# Patient Record
(demographics unavailable — no encounter records)

---

## 2017-02-22 NOTE — RADRPT
PROCEDURE:   US Abdomen. 

 

CLINICAL INDICATION:   abdominal pain 

 

TECHNIQUE:   Multiple real-time images were acquired of the patient's right upper quadrant abdomen a
nd retroperitoneum utilizing a high resolution transducer. 

 

COMPARISON:   None 

 

FINDINGS:

 

The liver demonstrates increased echogenicity.  The liver is normal in size and no focal solid lesio
ns are seen. The liver measures 13 cm in length. The portal vein is patent with normal direction of 
flow.  No intrahepatic biliary dilatation is seen. 

 

The patient is status post cholecystectomy. The common bile duct measures 14 mm in maximal dimension
.  

 

The visualized portions of the pancreas are unremarkable.   The tail of the pancreas is not seen.

 

No free fluid is identified.

 

The right kidney is normal in size, and demonstrate normal echogenicity and cortical thickness.  The
 right kidney measures 10 cm in long dimension.  There is no evidence of hydronephrosis. There are n
o kidney stones.  

 

 

RPTAT: AA

 

IMPRESSION:

 

Mild fatty infiltration of the liver.

Status post cholecystectomy.

Dilated CBD measuring 14 mm.

_____________________________________________ 

.Farhat Jacinto MD, MD           Date    Time 

Electronically viewed and signed by .Farhat Jacinto MD, MD on 02/22/2017 10:39 

 

D:  02/22/2017 10:39  T:  02/22/2017 10:39

.S/

## 2017-02-22 NOTE — ERD
ER Documentation


Chief Complaint


Date/Time


DATE: 2/22/17 


TIME: 10:58


Chief Complaint


gen abd pain and distention for the past few days. nause and vomiting





HPI


Patient is a 56-year-old female with no medical problems who presents with 

abdominal pain and vomiting.  She has upper abdominal pain which started on 

Sunday.  The pain is constant.  The symptoms were worse today.  She had no 

diarrhea and no fevers.  She tried a pain medicine but vomited.  She also tried 

Zofran and Nexium.  Upon review of old medical records this is the patient's 

third visit to the ER since 2013.





ROS


All systems reviewed and are negative except as per history of present illness.





Medications


Home Meds


Active Scripts


Ondansetron (Ondansetron Odt) 4 Mg Tab.rapdis, 4 MG PO Q6H Y for NAUSEA AND/OR 

VOMITING, #30 TAB


   Prov:NOBLE ALMAGUER MD         2/22/17


Hydrocodone/Acetaminophen (Norco  Tablet) 1 Each Tablet, 1 TAB PO Q6H Y 

for PAIN, #7 TAB


   Prov:NOBLE ALMAGUER MD         2/22/17


Nitrofurantoin Monohyd Macrocr* (Macrobid*) 100 Mg Capsr, 100 MG PO BID for 7 

Days, CAP


   Prov:NOBLE ALMAGUER MD         2/22/17





Allergies


Allergies:  


Coded Allergies:  


     No Known Allergy (Unverified , 12/5/13)





PMhx/Soc


Medical and Surgical Hx:  pt denies Medical Hx, pt denies Surgical Hx


History of Surgery:  No


Anesthesia Reaction:  No


Hx Neurological Disorder:  No


Hx Respiratory Disorders:  No


Hx Cardiac Disorders:  No


Hx Psychiatric Problems:  No


Hx Miscellaneous Medical Probl:  Yes (depression)


Hx Alcohol Use:  No


Hx Substance Use:  No


Hx Tobacco Use:  No


Smoking Status:  Never smoker





FmHx


Family History:  No diabetes





Physical Exam


Vitals





Vital Signs








  Date Time  Temp Pulse Resp B/P Pulse Ox O2 Delivery O2 Flow Rate FiO2


 


2/22/17 12:21 98.2 75 19 126/76 100 Room Air  


 


2/22/17 09:23 98.8 115 21 103/56 98   








Physical Exam


Const: No acute distress


Head:   Atraumatic 


Eyes:    Normal Conjunctiva


ENT:    Normal External Ears, Nose and Mouth.


Neck:               Full range of motion..~ No meningismus.


Resp:    Clear to auscultation bilaterally


Cardio:    Regular rate and rhythm, no murmurs


Abd:    Soft, minimal epigastric tenderness to palpation without rebound or 

guarding


Skin:    No petechiae or rashes


Back:    No midline or flank tenderness


Ext:    No cyanosis, or edema


Neur:    Awake and alert


Psych:    Normal Mood and Affect


Result Diagram:  


2/22/17 0940                                                                   

             2/22/17 0940





Results 24 hrs





 Laboratory Tests








Test


  2/22/17


09:40 2/22/17


09:41


 


Alanine Aminotransferase


(ALT/SGPT) 44IU/L 


  


 


 


Albumin 2.7g/dl  


 


Albumin/Globulin Ratio 0.96  


 


Alkaline Phosphatase 109IU/L  


 


Anion Gap 13  


 


Aspartate Amino Transf


(AST/SGOT) 49IU/L 


  


 


 


Basophils # 0.010^3/ul  


 


Basophils % 0.5%  


 


Blood Morphology Comment   


 


Blood Urea Nitrogen 38mg/dl  


 


Calcium Level 10.0mg/dl  


 


Carbon Dioxide Level 34mmol/L  


 


Chloride Level 96mmol/L  


 


Creatinine 0.71mg/dl  


 


Direct Bilirubin 0.00mg/dl  


 


Eosinophils # 0.110^3/ul  


 


Eosinophils % 1.6%  


 


Globulin 2.80g/dl  


 


Glucose Level 132mg/dl  


 


Hematocrit 23.3%  


 


Hemoglobin 8.0g/dl  


 


Indirect Bilirubin 1.2mg/dl  


 


Lipase 115U/L  


 


Lymphocytes # 3.510^3/ul  


 


Lymphocytes % 40.9%  


 


Mean Corpuscular Hemoglobin 35.0pg  


 


Mean Corpuscular Hemoglobin


Concent 34.4g/dl 


  


 


 


Mean Corpuscular Volume 101.8fl  


 


Mean Platelet Volume 9.1fl  


 


Monocytes # 0.910^3/ul  


 


Monocytes % 9.9%  


 


Neutrophils # 4.110^3/ul  


 


Neutrophils % 47.1%  


 


Nucleated Red Blood Cells # 0.010^3/ul  


 


Nucleated Red Blood Cells % 0.0/100WBC  


 


Platelet Count 9010^3/UL  


 


Potassium Level 3.5mmol/L  


 


Red Blood Count 2.2910^6/ul  


 


Red Cell Distribution Width 16.3%  


 


Sodium Level 139mmol/L  


 


Total Bilirubin 1.2mg/dl  


 


Total Protein 5.5g/dl  


 


Troponin I 0.036ng/ml  


 


White Blood Count 8.610^3/ul  


 


Urine Bacteria  FEW 


 


Urine Bilirubin  NEGATIVE 


 


Urine Clarity  CLEAR 


 


Urine Color  LT. YELLOW 


 


Urine Glucose  NEGATIVE% 


 


Urine Hemoglobin  NEGATIVE 


 


Urine Ketones  NEGATIVE 


 


Urine Leukocyte Esterase  1+ 


 


Urine Microscopic RBC  NONE SEEN/HPF 


 


Urine Microscopic WBC  2-5/HPF 


 


Urine Nitrite  NEGATIVE 


 


Urine Specific Gravity  1.010 


 


Urine Squamous Epithelial


Cells 


  MODERATE 


 


 


Urine Total Protein  NEGATIVE 


 


Urine Urobilinogen  0.2  E.U./dL 


 


Urine pH  7.0 








 Current Medications








 Medications


  (Trade)  Dose


 Ordered  Sig/Emily


 Route


 PRN Reason  Start Time


 Stop Time Status Last Admin


Dose Admin


 


 Sodium Chloride


  (NS)  1,000 ml @ 


 1,000 mls/hr  Q1H STAT


 IV


   2/22/17 09:32


 2/22/17 10:31 DC 2/22/17 09:56


 


 


 Morphine Sulfate


  (morphine)  4 mg  ONCE  STAT


 IV


   2/22/17 09:32


 2/22/17 09:58 DC 2/22/17 09:56


 


 


 Ondansetron HCl


  (Zofran Inj)  4 mg  ONCE  STAT


 IV


   2/22/17 09:32


 2/22/17 09:58 DC 2/22/17 09:56


 











Procedures/MDM


EKG read by me: 


Rate/Rhythm: Regular rate and rhythm at a rate of 99


Intervals:    Normal


Impression:     No evidence of ischemia or arrhythmia





PROCEDURE:   US Abdomen. 


 


CLINICAL INDICATION:   abdominal pain 


 


TECHNIQUE:   Multiple real-time images were acquired of the patient's right 

upper quadrant abdomen and retroperitoneum utilizing a high resolution 

transducer. 


 


COMPARISON:   None 


 


FINDINGS:


 


The liver demonstrates increased echogenicity.  The liver is normal in size and 

no focal solid lesions are seen. The liver measures 13 cm in length. The portal 

vein is patent with normal direction of flow.  No intrahepatic biliary 

dilatation is seen. 


 


The patient is status post cholecystectomy. The common bile duct measures 14 mm 

in maximal dimension.  


 


The visualized portions of the pancreas are unremarkable.   The tail of the 

pancreas is not seen.


 


No free fluid is identified.


 


The right kidney is normal in size, and demonstrate normal echogenicity and 

cortical thickness.  The right kidney measures 10 cm in long dimension.  There 

is no evidence of hydronephrosis. There are no kidney stones.  


 


 


RPTAT: AA


 


IMPRESSION:


 


Mild fatty infiltration of the liver.


Status post cholecystectomy.


Dilated CBD measuring 14 mm.


_____________________________________________ 


.Farhat Jacinto MD, MD           Date    Time 


Electronically viewed and signed by .Farhat Jacinto MD, MD on 02/22/2017 10:

39 


 


Patient is a 56-year-old female presents with abdominal pain and vomiting.  She 

has anemia with a hemoglobin of 8 but does not need transfusion at this time.  

Her LFTs and lipase are basically normal.  She was found to have dehydration 

with a BUN creatinine ratio greater than 20 and she was given fluids IV.  The 

patient was also given pain and nausea medicine in the emergency department and 

feels better.  Her urinalysis shows acute cystitis and I will treat her with 1 

week of Macrobid.  At this point I doubt cholecystitis that she has had a 

cholecystectomy.  I doubt pancreatitis, bowel obstruction, or appendicitis.  I 

believe outpatient management is appropriate but the patient will need close 

follow-up with her primary doctor within 24-48 hours for reevaluation.  She can 

return sooner for any worsening symptoms.  She was given a copy of her 

laboratory studies and ultrasound report prior to discharge.





Departure


Diagnosis:  


 Primary Impression:  


 Cystitis


 Additional Impressions:  


 Anemia


 Anemia type:  unspecified type  Qualified Code:  D64.9 - Anemia, unspecified 

type


 Abdominal pain


 Abdominal location:  unspecified location  Qualified Code:  R10.9 - Abdominal 

pain, unspecified location


Condition:  Fair


Patient Instructions:  Abdominal Pain, Anemia, Cystitis


Referrals:  


[facility]


Usted se ha hecho un examen mdico de control que le indica que no est en vandana 

condicin que requiera tratamiento urgente en el Departamento de Emergencia. Un 

estudio ms profundo y el tratamiento de bunch condicin pueden esperar sin ningn 

riesgo hasta que usted sea atendida/o en el consultorio de bunch mdico o vandana cl

mik. Es responsabilidad suya arreglar vandana saray para el seguimiento del mckenna. 





MANEJO DE CONDICIONES NO URGENTES EN EL FUTURO


1) Si usted tiene un mdico de atencin primaria:





Usted debera llamar a bunch mdico de atencin primaria antes de venir al 

departamento de emergencia. Despus de las horas de consultorio, bunch doctor o bunch 

asociado/a est disponible por telfono. El mdico o enfermero de aliza en el 

servicio telefnico puede asesorarle por roberto medio para atender el problema, o 

mckenna contrario se puede programar vandana saray.





2) Si usted no tiene un mdico de atencin primaria:


Llame al mdico o clnica de referencia que aparece abajo berenice las horas de 

consultorio para hacer vandana saray para que le vean.





CLINICAS:


[facility]  [phone] [address] [facility]  [phone] [address] [facility] [phone] [address] [place] [id number] [phone] [facility]. [place] [address] [phone] [facility] [phone] 


[address]





Additional Instructions:  


Visite a bunch mdico maana para un EXAMEN.Regrese a estas instalaciones si no se 

mejora miriam esperbamos o miriam le dijimos.











NOBLE ALMAGUER MD Feb 22, 2017 10:58

## 2017-03-28 NOTE — ERD
DATE OF SERVICE:  03/28/2017

 

 

HISTORY OF PRESENT ILLNESS:  The patient is a 56-year-old female coming in complaining of a cough an
d congestion for 3 days.  The patient states she has had a productive cough.  She has taken NyQuil a
nd DayQuil for the last 2 days.  She has no shortness of breath, no chest pain.  No history of pneum
onia or asthma in the past.  No sick contacts at home.

 

PAST MEDICAL HISTORY:  Denies medical problems.

 

ALLERGIES TO MEDICATIONS:  DENIES.

 

PAST SURGICAL HISTORY:  Cholecystectomy.

 

SOCIAL HISTORY:  Denies.

 

REVIEW OF SYSTEMS:  A 12-point review of systems was done.  Refer to HPI for positives, all other sy
stems negative.

 

PHYSICAL EXAMINATION

VITAL SIGNS:  Temperature is 97.6, pulse 67, blood pressure is 148/73, respiratory rate 20, O2 satur
ation 100% on room air.  Pain intensity is 0/10.

GENERAL:  The patient is well-appearing, well-nourished, in no acute distress.

HEENT:  Atraumatic. Conjunctivae are pink. Pupils equal, round, and reactive to light. There is no s
cleral icterus.  Tympanic membranes clear bilaterally. Oropharynx clear. No nystagmus or photophobia
. 

CHEST:  Clear to auscultation bilaterally. There are no rales, wheezes or rhonchi. 

HEART:  Regular rate and rhythm. No murmurs, clicks, rubs or gallops. No S3 or S4.

ABDOMEN:  Soft, nontender and nondistended. Good bowel sounds. No rebound or guarding. No gross sascha
tonitis. No gross organomegaly or masses. No Joel sign or McBurney point tenderness.

SKIN:  There is no apparent rash or petechia. The skin is warm and dry.

 

DIAGNOSIS:  Cough, likely viral.

 

MEDICAL DECISION MAKING:  I have low suspicion for pneumonia.  Low suspicion for respiratory distres
s or hypoxia.  Low suspicion for sepsis.  The patient's exams are within normal limits.  The patient
 has an oxygen saturation of 100% on room air and does not have abnormal breath sounds heard on ausc
ultation.  I do not feel that there is indication for imaging or antibiotics.

 

DISCHARGE:  The patient is discharged stable.  The patient is given prescription for promethazine DM
 and Tessalon and told to follow up with primary care within 1 to 2 days for reevaluation.  The kayden
ent was told if symptoms progress or worsen, to return to the ER.  All other questions answered at t
he time of discharge.  Discharge summary given at the time of departure.  The patient understood and
 complied with plan.

 

 

Dictated By: VIOLETA CASAREZ for RENATO MANCERA/JACOBY

DD:    03/28/2017 10:41:00

DT:    03/28/2017 12:12:55

Conf#: [id number]

DID#:  [id number]